# Patient Record
Sex: MALE | Race: OTHER | HISPANIC OR LATINO
[De-identification: names, ages, dates, MRNs, and addresses within clinical notes are randomized per-mention and may not be internally consistent; named-entity substitution may affect disease eponyms.]

---
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
## 2021-02-26 ENCOUNTER — APPOINTMENT (OUTPATIENT)
Dept: SURGERY | Facility: CLINIC | Age: 21
End: 2021-02-26
Payer: MEDICAID

## 2021-02-26 VITALS
HEIGHT: 70 IN | TEMPERATURE: 97.9 F | DIASTOLIC BLOOD PRESSURE: 70 MMHG | SYSTOLIC BLOOD PRESSURE: 118 MMHG | BODY MASS INDEX: 44.24 KG/M2 | WEIGHT: 309 LBS

## 2021-02-26 DIAGNOSIS — R10.11 RIGHT UPPER QUADRANT PAIN: ICD-10-CM

## 2021-02-26 PROCEDURE — 99072 ADDL SUPL MATRL&STAF TM PHE: CPT

## 2021-02-26 PROCEDURE — 99204 OFFICE O/P NEW MOD 45 MIN: CPT

## 2021-03-03 PROBLEM — R10.11 ABDOMINAL PAIN, RUQ (RIGHT UPPER QUADRANT): Status: ACTIVE | Noted: 2021-03-03

## 2021-03-03 NOTE — HISTORY OF PRESENT ILLNESS
[de-identified] : 21yo male with PMHx of class III obesity BMI 44.3 presenting for consultation of weight loss surgery/management. Patient states he has struggled with his weight for several years. Previous weight loss attempts include various diet and exercise regimens with limited success. Patient reports dyspnea upon exertion, but never at rest. He reports bilateral knee pain, which he attributes his weight. He denies reflux symptoms. He has never had an EGD or colonoscopy.\par

## 2021-03-03 NOTE — CONSULT LETTER
[Dear  ___] : Dear  [unfilled], [Courtesy Letter:] : I had the pleasure of seeing your patient, [unfilled], in my office today. [Please see my note below.] : Please see my note below. [Sincerely,] : Sincerely, [FreeTextEntry3] : Madisyn Goff MD FACS\par Bariatric & Minimally Invasive Surgery\par [facility]\par [phone]

## 2021-03-03 NOTE — PHYSICAL EXAM
[Obese, well nourished, in no acute distress] : obese, well nourished, in no acute distress [Normal] : affect appropriate [de-identified] : soft, obese, +RUQ tenderness, negative hinojosa's sign, no hernia, no scars, no rebound/guarding, no masses [de-identified] : no CVA tenderness B/L

## 2021-03-03 NOTE — ASSESSMENT
[FreeTextEntry1] : 19yo male with PMHx of class III obesity BMI 44.3 presenting for consultation of weight loss surgery/management. \par -discussed surgical options - gastric band, sleeve gastrectomy, kirstin-en-Y gastric bypass\par -discussed potential surgical complications for each option - including bleeding, infection, pain, hernia, leak, stricture, and blood clots\par -discussed smoking of any kind (cigarettes, marijuana, e-cigarettes) is prohibited\par -at this time, the patient is interested in SLEEVE GASTRECTOMY, concerned about potential complications of bypass\par -I informed him that there may be findings on EGD that disqualify him from sleeve, particularly burris's esophagus.\par -recommend high protein, low carb, low fat diet with protein shakes\par -encourage exercise regimen - starting with 10 minutes per day combining cardio and resistance training with the goal of 30 minutes of exercise 4-5 times per week\par -next step - bariatric information session and nutritionist at nearest convenience \par -abdominal US ordered to further evaluate RUQ tenderness on examination\par -pre-operative preparation - will include PCP evaluation, cardiac evaluation, pulmonary evaluation, EGD, nutritional evaluation (3 months), labs\par

## 2021-03-03 NOTE — REASON FOR VISIT
[Initial Consult] : an initial consult for [Morbid Obesity (BMI>40)] : morbid obesity (bmi>40) [[employer] ] : provided by [employer]   [FreeTextEntry1] : [id number] [FreeTextEntry2] : Carolee

## 2021-03-09 ENCOUNTER — NON-APPOINTMENT (OUTPATIENT)
Age: 21
End: 2021-03-09

## 2021-04-05 ENCOUNTER — APPOINTMENT (OUTPATIENT)
Dept: SURGERY | Facility: CLINIC | Age: 21
End: 2021-04-05
Payer: MEDICAID

## 2021-04-05 ENCOUNTER — APPOINTMENT (OUTPATIENT)
Dept: SURGERY | Facility: CLINIC | Age: 21
End: 2021-04-05

## 2021-04-05 VITALS — WEIGHT: 310 LBS | HEIGHT: 70 IN | BODY MASS INDEX: 44.38 KG/M2

## 2021-04-05 DIAGNOSIS — E66.01 MORBID (SEVERE) OBESITY DUE TO EXCESS CALORIES: ICD-10-CM

## 2021-04-05 DIAGNOSIS — Z13.9 ENCOUNTER FOR SCREENING, UNSPECIFIED: ICD-10-CM

## 2021-04-05 DIAGNOSIS — Z00.00 ENCOUNTER FOR GENERAL ADULT MEDICAL EXAMINATION W/OUT ABNORMAL FINDINGS: ICD-10-CM

## 2021-04-05 DIAGNOSIS — Z71.89 OTHER SPECIFIED COUNSELING: ICD-10-CM

## 2021-04-05 DIAGNOSIS — Z71.3 DIETARY COUNSELING AND SURVEILLANCE: ICD-10-CM

## 2021-04-05 PROCEDURE — ZZZZZ: CPT

## 2021-05-05 ENCOUNTER — APPOINTMENT (OUTPATIENT)
Dept: SURGERY | Facility: CLINIC | Age: 21
End: 2021-05-05

## 2021-05-20 ENCOUNTER — NON-APPOINTMENT (OUTPATIENT)
Age: 21
End: 2021-05-20

## 2021-06-13 ENCOUNTER — EMERGENCY (EMERGENCY)
Facility: HOSPITAL | Age: 21
LOS: 0 days | Discharge: HOME | End: 2021-06-13
Attending: EMERGENCY MEDICINE | Admitting: EMERGENCY MEDICINE
Payer: MEDICAID

## 2021-06-13 VITALS
HEART RATE: 85 BPM | TEMPERATURE: 100 F | WEIGHT: 293.21 LBS | SYSTOLIC BLOOD PRESSURE: 148 MMHG | RESPIRATION RATE: 18 BRPM | DIASTOLIC BLOOD PRESSURE: 91 MMHG

## 2021-06-13 DIAGNOSIS — K08.89 OTHER SPECIFIED DISORDERS OF TEETH AND SUPPORTING STRUCTURES: ICD-10-CM

## 2021-06-13 DIAGNOSIS — F17.290 NICOTINE DEPENDENCE, OTHER TOBACCO PRODUCT, UNCOMPLICATED: ICD-10-CM

## 2021-06-13 PROCEDURE — 99284 EMERGENCY DEPT VISIT MOD MDM: CPT

## 2021-06-13 RX ORDER — IBUPROFEN 200 MG
1 TABLET ORAL
Qty: 12 | Refills: 0
Start: 2021-06-13 | End: 2021-06-16

## 2021-06-13 RX ORDER — AMOXICILLIN 250 MG/5ML
1 SUSPENSION, RECONSTITUTED, ORAL (ML) ORAL
Qty: 20 | Refills: 0
Start: 2021-06-13 | End: 2021-06-22

## 2021-06-13 NOTE — ED PROVIDER NOTE - PHYSICAL EXAMINATION
CONSTITUTIONAL: Well-developed; well-nourished; in no acute distress.   SKIN: warm, dry.  HEAD: Normocephalic; atraumatic.  EYES: PERRL, EOMI, no conjunctival erythema.  ENMT: tender to palpation of tooth 15 and cavity to tooth 14. no abscess, no facial edema. floor of mouth is not raised, nontender.   NECK: Supple; non tender.  EXT: Normal ROM.  No clubbing, cyanosis or edema.   NEURO: Alert, oriented, grossly unremarkable.  PSYCH: Cooperative, appropriate.

## 2021-06-13 NOTE — ED PROVIDER NOTE - NSFOLLOWUPCLINICS_GEN_ALL_ED_FT
[facility]  Dental  [address] [place] [id number]  Phone: [phone]  Fax:   Follow Up Time: 1-3 Days

## 2021-06-13 NOTE — CONSULT NOTE ADULT - SUBJECTIVE AND OBJECTIVE BOX
Patient is a 20y old  Male who presents with a chief complaint of dental pain upper left tooth.    HPI:Patient stated that he has been experiencing pain in tooth for the past 7 days and the pain was keeping him from sleeping tonight which alerted him to come to ED for local anesthesia.      PAST MEDICAL & SURGICAL HISTORY:    (  - ) heart valve replacement  (  - ) joint replacement    MEDICATIONS  (STANDING): denies    MEDICATIONS  (PRN): denies    No known drug Allergies      Vital Signs Last 24 Hrs  T(C): 37.5 (13 Jun 2021 02:37), Max: 37.5 (13 Jun 2021 02:37)  T(F): 99.5 (13 Jun 2021 02:37), Max: 99.5 (13 Jun 2021 02:37)  HR: 85 (13 Jun 2021 02:37) (85 - 85)  BP: 148/91 (13 Jun 2021 02:37) (148/91 - 148/91)  BP(mean): --  RR: 18 (13 Jun 2021 02:37) (18 - 18)  SpO2: --        EOE:  TMJ ( -  ) clicks                     (  - ) pops                     (  - ) crepitus             Mandible <<FROM>>             Facial bones and MOM <<grossly intact>>             (  - ) trismus             ( -  ) lymphadenopathy             ( -  ) swelling             (  - ) asymmetry             (  - ) palpation             (  - ) dyspnea             (  - ) dysphagia             (  - ) loss of consciousness    IOE:  <<permanent>> dentition:            <<multiple carious teeth>>                      Caries <<DO caries tooth #14  >>                hard/soft palate:  ( -  ) palatal torus, <<No pathology noted>>            tongue/FOM <<No pathology noted>>            labial/buccal mucosa <<No pathology noted>>           (  - ) percussion           (  - ) palpation           ( -  ) swelling            (  - ) abscess           ( - ) sinus tract    *DENTAL RADIOGRAPHS: none    RADIOLOGY & ADDITIONAL STUDIES: none    *ASSESSMENT: Patient presents alert, sitting up. Severe pain on percussion tooth #14. Tooth #14 large DO caries. No swelling, trismus, lymphadenopathy, fever, chills, difficulty breathing or swallowing present.     *PLAN: Infiltration of tooth #14 for pain management. Follow up with dental provider Monday morning.    PROCEDURE:   Verbal and written consent given.  Anesthesia: <<   1 carpule 0.5% marcaine with 1:200,000 epinephrine >>   Treatment: << Infiltration tooth #14 given with 1 carpule 0.5% marcaine with 1:200,000 epinephrine.   >>     RECOMMENDATIONS:  1) amoxicillin 500 mg q8h 7 days  2) ibuprofen 600 mg q6h 5 days  3) Dental F/U with outpatient dentist for comprehensive dental care.   4) If any difficulty swallowing/breathing, fever occur, return to ER.     Marisabel Lucas, #[id number]

## 2021-06-13 NOTE — ED PROVIDER NOTE - ATTENDING CONTRIBUTION TO CARE
20 y M to ED with dental pain.  1 week of L upper jaw pain and worsening pain this pm so to ED for eval.   no dysphagia, no drooling and no fevers.

## 2021-06-13 NOTE — ED PROVIDER NOTE - OBJECTIVE STATEMENT
Patient is a 19 yo M w/ no pmh p/w dental pain. Patient endorses 1 week of left upper tooth pain but worsening in the last 2 days. no fevers, no swelling, no difficulty breathing, or swallowing.

## 2021-06-13 NOTE — ED PEDIATRIC TRIAGE NOTE - CHIEF COMPLAINT QUOTE
patient c/o left upper molar pain x 7 days, worsening tonight and keeping him from sleep. took medication (he doesn't know what but not tylenol) 2 hours pta.

## 2021-06-13 NOTE — ED PROVIDER NOTE - NS ED ROS FT
Constitutional: No fevers.   Eyes:  No visual changes, eye pain or discharge.  ENMT:  +dental pain.   MS:  No myalgia, muscle weakness, joint pain or back pain.  Neuro:  No headache or weakness.  No LOC.  Skin:  No skin rash.   Endocrine: No history of thyroid disease or diabetes.

## 2021-06-13 NOTE — ED PROVIDER NOTE - PROGRESS NOTE DETAILS
DC: Dental performed a dental block. instructed to return monday for dental clinic. abx sent to pharmacy.

## 2021-06-13 NOTE — CHART NOTE - NSCHARTNOTEFT_GEN_A_CORE
Pt is a 20 year old male who presented to the ED with a c/o left upper molar pain x 7 days.  SW spoke to pt with the assistance of  [id number] (Fingerprint).  Pt reported that he has a PCP, but that he did not have the doctor's name at hand.  Pt declined any assistance from SW.

## 2021-06-13 NOTE — ED PROVIDER NOTE - PATIENT PORTAL LINK FT
You can access the FollowMyHealth Patient Portal offered by [facility] by registering at the following website: [email or web address]. By joining [employer]s FollowMyHealth portal, you will also be able to view your health information using other applications (apps) compatible with our system.

## 2021-06-14 ENCOUNTER — OUTPATIENT (OUTPATIENT)
Dept: OUTPATIENT SERVICES | Facility: HOSPITAL | Age: 21
LOS: 1 days | Discharge: HOME | End: 2021-06-14